# Patient Record
Sex: FEMALE | HISPANIC OR LATINO
[De-identification: names, ages, dates, MRNs, and addresses within clinical notes are randomized per-mention and may not be internally consistent; named-entity substitution may affect disease eponyms.]

---

## 2017-03-10 ENCOUNTER — APPOINTMENT (OUTPATIENT)
Dept: BARIATRICS | Facility: CLINIC | Age: 57
End: 2017-03-10

## 2017-08-25 ENCOUNTER — APPOINTMENT (OUTPATIENT)
Dept: BARIATRICS | Facility: CLINIC | Age: 57
End: 2017-08-25

## 2018-01-12 ENCOUNTER — OUTPATIENT (OUTPATIENT)
Dept: OUTPATIENT SERVICES | Facility: HOSPITAL | Age: 58
LOS: 1 days | Discharge: HOME | End: 2018-01-12

## 2018-01-12 DIAGNOSIS — K75.2 NONSPECIFIC REACTIVE HEPATITIS: ICD-10-CM

## 2018-11-14 ENCOUNTER — MESSAGE (OUTPATIENT)
Age: 58
End: 2018-11-14

## 2019-12-23 ENCOUNTER — MESSAGE (OUTPATIENT)
Age: 59
End: 2019-12-23

## 2021-06-16 ENCOUNTER — APPOINTMENT (OUTPATIENT)
Dept: BARIATRICS | Facility: CLINIC | Age: 61
End: 2021-06-16
Payer: MEDICAID

## 2021-06-16 VITALS
OXYGEN SATURATION: 98 % | HEART RATE: 72 BPM | BODY MASS INDEX: 24.65 KG/M2 | HEIGHT: 64 IN | WEIGHT: 144.38 LBS | TEMPERATURE: 97.4 F | DIASTOLIC BLOOD PRESSURE: 80 MMHG | SYSTOLIC BLOOD PRESSURE: 153 MMHG

## 2021-06-16 DIAGNOSIS — Z98.84 BARIATRIC SURGERY STATUS: ICD-10-CM

## 2021-06-16 DIAGNOSIS — R11.15 CYCLICAL VOMITING SYNDROME UNRELATED TO MIGRAINE: ICD-10-CM

## 2021-06-16 DIAGNOSIS — K21.9 GASTRO-ESOPHAGEAL REFLUX DISEASE W/OUT ESOPHAGITIS: ICD-10-CM

## 2021-06-16 PROCEDURE — 99215 OFFICE O/P EST HI 40 MIN: CPT

## 2021-06-16 NOTE — ADDENDUM
[FreeTextEntry1] : This note was written by Abbie Sterling on 06/16/2021 acting as scribe for Dr. Bland.

## 2021-06-16 NOTE — ASSESSMENT
[FreeTextEntry1] : Our plan is to check blood work, repeat CT, repeat EGD. If all normal, would suggest lap exploration to rule out pathology such as internal hernia. If exploration negative, would then suggest feeding tube into the remnant stomach as she requires nutrition to function.

## 2021-06-16 NOTE — END OF VISIT
[FreeTextEntry3] : All medical record entries made by the Scribe were at my, Dr. Bland's, discretion and personally dictated by me on 06/16/2021. I have reviewed the chart and agree that the record accurately reflects my personal performance of the history, physical exam, assessment and plan. I have also personally directed, reviewed and agreed to the chart.

## 2021-06-16 NOTE — HISTORY OF PRESENT ILLNESS
[de-identified] : Viviana Viera comes to see us today. She is a 62 y/o F who had a sleeve done by me 7 years ago, then had a tummy tuck and had severe GERD and then had revisional bypass done at [facility] in [place] December 2020. This had 125 cm lisy limb, 40 cm BP limb. States that she has pain, diarrhea and vomiting. Has been seen out in [facility]. EGD 4/26/21 was normal. Has had CT scans which I do not have access to, which she states were normal. Has iron deficiency anemia. States that she does not feel well. Exam shows skinny legs, loss of muscle, no acute abdominal findings. Suggest she should re-confer with Dr. White to evaluate nutrition because if she is not eating, she requires TPN. Told if has persistent vomiting, must come to ER to make sure she is getting thiamine. No evidence of cerebral attacks at present. States she is taking her vitamins. Our plan is to check blood work, repeat CT, repeat EGD. If all normal, would suggest lap exploration to rule out pathology such as internal hernia. If all negative, would then suggest feeding tube into the remnant stomach as she requires nutrition to function.

## 2023-11-15 ENCOUNTER — APPOINTMENT (OUTPATIENT)
Dept: BARIATRICS | Facility: CLINIC | Age: 63
End: 2023-11-15